# Patient Record
Sex: MALE | Race: WHITE | HISPANIC OR LATINO | URBAN - METROPOLITAN AREA
[De-identification: names, ages, dates, MRNs, and addresses within clinical notes are randomized per-mention and may not be internally consistent; named-entity substitution may affect disease eponyms.]

---

## 2017-01-06 ENCOUNTER — IMPORTED ENCOUNTER (OUTPATIENT)
Dept: URBAN - METROPOLITAN AREA CLINIC 9 | Facility: CLINIC | Age: 82
End: 2017-01-06

## 2017-01-23 NOTE — PATIENT DISCUSSION
Cataract Monitor Counseling:   I have discussed continuing with current spectacles vs updating. I have offered the patient a new spectacle prescription to fill if desires. Return to follow up as scheduled or sooner if symptoms arise.

## 2017-03-06 ENCOUNTER — IMPORTED ENCOUNTER (OUTPATIENT)
Dept: URBAN - METROPOLITAN AREA CLINIC 9 | Facility: CLINIC | Age: 82
End: 2017-03-06

## 2017-05-08 ENCOUNTER — IMPORTED ENCOUNTER (OUTPATIENT)
Dept: URBAN - METROPOLITAN AREA CLINIC 9 | Facility: CLINIC | Age: 82
End: 2017-05-08

## 2017-06-26 ENCOUNTER — IMPORTED ENCOUNTER (OUTPATIENT)
Dept: URBAN - METROPOLITAN AREA CLINIC 9 | Facility: CLINIC | Age: 82
End: 2017-06-26

## 2017-10-27 ENCOUNTER — IMPORTED ENCOUNTER (OUTPATIENT)
Dept: URBAN - METROPOLITAN AREA CLINIC 9 | Facility: CLINIC | Age: 82
End: 2017-10-27

## 2018-03-08 ENCOUNTER — IMPORTED ENCOUNTER (OUTPATIENT)
Dept: URBAN - METROPOLITAN AREA CLINIC 9 | Facility: CLINIC | Age: 83
End: 2018-03-08

## 2018-03-20 ENCOUNTER — IMPORTED ENCOUNTER (OUTPATIENT)
Dept: URBAN - METROPOLITAN AREA CLINIC 9 | Facility: CLINIC | Age: 83
End: 2018-03-20

## 2018-05-16 NOTE — PATIENT DISCUSSION
Disc needs prism and no signs of a palsy. 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 will call with any worsening to watch for myasthenia gravis or thryoid issue.  Per pt thyroid was normal.

## 2018-07-27 ENCOUNTER — IMPORTED ENCOUNTER (OUTPATIENT)
Dept: URBAN - METROPOLITAN AREA CLINIC 9 | Facility: CLINIC | Age: 83
End: 2018-07-27

## 2018-11-16 ENCOUNTER — IMPORTED ENCOUNTER (OUTPATIENT)
Dept: URBAN - METROPOLITAN AREA CLINIC 9 | Facility: CLINIC | Age: 83
End: 2018-11-16

## 2018-12-03 ENCOUNTER — IMPORTED ENCOUNTER (OUTPATIENT)
Dept: URBAN - METROPOLITAN AREA CLINIC 9 | Facility: CLINIC | Age: 83
End: 2018-12-03

## 2019-04-25 ENCOUNTER — IMPORTED ENCOUNTER (OUTPATIENT)
Dept: URBAN - METROPOLITAN AREA CLINIC 9 | Facility: CLINIC | Age: 84
End: 2019-04-25

## 2019-09-06 NOTE — PATIENT DISCUSSION
try toric lens and then if no improvement then give MF a try. long disc with pt that we cannot make cl as sharp as glasses.

## 2019-12-02 ENCOUNTER — IMPORTED ENCOUNTER (OUTPATIENT)
Dept: URBAN - METROPOLITAN AREA CLINIC 9 | Facility: CLINIC | Age: 84
End: 2019-12-02

## 2019-12-26 ENCOUNTER — IMPORTED ENCOUNTER (OUTPATIENT)
Dept: URBAN - METROPOLITAN AREA CLINIC 9 | Facility: CLINIC | Age: 84
End: 2019-12-26

## 2020-01-06 ENCOUNTER — IMPORTED ENCOUNTER (OUTPATIENT)
Dept: URBAN - METROPOLITAN AREA CLINIC 9 | Facility: CLINIC | Age: 85
End: 2020-01-06

## 2020-03-03 ENCOUNTER — IMPORTED ENCOUNTER (OUTPATIENT)
Dept: URBAN - METROPOLITAN AREA CLINIC 9 | Facility: CLINIC | Age: 85
End: 2020-03-03

## 2020-03-20 ENCOUNTER — IMPORTED ENCOUNTER (OUTPATIENT)
Dept: URBAN - METROPOLITAN AREA CLINIC 9 | Facility: CLINIC | Age: 85
End: 2020-03-20

## 2020-07-23 ENCOUNTER — IMPORTED ENCOUNTER (OUTPATIENT)
Dept: URBAN - METROPOLITAN AREA CLINIC 9 | Facility: CLINIC | Age: 85
End: 2020-07-23

## 2020-11-18 NOTE — PATIENT DISCUSSION
Long disc with pt that I cannot improve her vision greatly with rx alone. Mac OCT is normal OU---must be due to cataract and dry eye.

## 2020-12-07 ENCOUNTER — IMPORTED ENCOUNTER (OUTPATIENT)
Dept: URBAN - METROPOLITAN AREA CLINIC 9 | Facility: CLINIC | Age: 85
End: 2020-12-07

## 2020-12-14 ENCOUNTER — IMPORTED ENCOUNTER (OUTPATIENT)
Dept: URBAN - METROPOLITAN AREA CLINIC 9 | Facility: CLINIC | Age: 85
End: 2020-12-14

## 2021-04-08 ENCOUNTER — IMPORTED ENCOUNTER (OUTPATIENT)
Dept: URBAN - METROPOLITAN AREA CLINIC 9 | Facility: CLINIC | Age: 86
End: 2021-04-08

## 2021-04-09 ENCOUNTER — IMPORTED ENCOUNTER (OUTPATIENT)
Dept: URBAN - METROPOLITAN AREA CLINIC 9 | Facility: CLINIC | Age: 86
End: 2021-04-09

## 2021-04-13 ENCOUNTER — IMPORTED ENCOUNTER (OUTPATIENT)
Dept: URBAN - METROPOLITAN AREA CLINIC 9 | Facility: CLINIC | Age: 86
End: 2021-04-13

## 2021-05-07 ENCOUNTER — IMPORTED ENCOUNTER (OUTPATIENT)
Dept: URBAN - METROPOLITAN AREA CLINIC 9 | Facility: CLINIC | Age: 86
End: 2021-05-07

## 2021-09-13 ENCOUNTER — IMPORTED ENCOUNTER (OUTPATIENT)
Dept: URBAN - METROPOLITAN AREA CLINIC 9 | Facility: CLINIC | Age: 86
End: 2021-09-13

## 2021-09-15 ENCOUNTER — IMPORTED ENCOUNTER (OUTPATIENT)
Dept: URBAN - METROPOLITAN AREA CLINIC 9 | Facility: CLINIC | Age: 86
End: 2021-09-15

## 2021-10-01 ENCOUNTER — IMPORTED ENCOUNTER (OUTPATIENT)
Dept: URBAN - METROPOLITAN AREA CLINIC 9 | Facility: CLINIC | Age: 86
End: 2021-10-01

## 2021-10-01 PROBLEM — H40.42X1: Noted: 2021-10-01

## 2021-10-01 PROBLEM — H04.123: Noted: 2021-10-01

## 2021-10-02 ASSESSMENT — VISUAL ACUITY
OD_SC: 20/100 SN
OS_SC: 20/70 SN
OS_CC: 20/25 - SN
OD_CC: 20/25 SN

## 2021-10-02 ASSESSMENT — TONOMETRY
OD_IOP_MMHG: 16
OS_IOP_MMHG: 20

## 2021-10-18 ASSESSMENT — VISUAL ACUITY
OS_PH: 20/50 + SN
OD_SC: 20/100 - SN
OS_CC: 20/40 SN
OD_CC: 20/25 -2 SN
OD_CC: 20/20 -2 SN
OS_CC: 20/40 SN
OS_CC: 20/40 SN
OS_CC: 20/25 -2 SN
OS_CC: 20/30 + SN
OD_CC: 20/30 - SN
OD_CC: 20/25 -2 SN
OD_CC: 20/40 + SN
OS_CC: 20/80 SN
OS_CC: 20/30 SN
OS_CC: 20/30 -2 SN
OD_CC: 20/25 -2 SN
OS_CC: 20/30 SN
OS_CC: 20/25 -2 SN
OS_CC: 20/40 -2 SN
OD_CC: 20/40 -2 SN
OD_CC: 20/25 SN
OS_CC: 20/40 + SN
OS_CC: 20/25 SN
OD_CC: 20/30 - SN
OS_CC: 20/50 - SN
OD_CC: 20/30 -2 SN
OD_CC: 20/40 + SN
OD_PH: 20/50 + SN
OD_CC: 20/30 - SN
OD_SC: 20/60 SN
OD_CC: 20/25 -2 SN
OS_SC: 20/70 - SN
OD_CC: 20/30 SN
OS_CC: 20/30 - SN
OD_CC: 20/40 + SN
OD_CC: 20/30 + SN
OD_CC: 20/30 + SN
OS_CC: 20/40 -2 SN
OS_CC: 20/25 - SN
OS_SC: 20/200 + SN
OS_CC: 20/30 SN
OS_CC: 20/40 + SN
OD_CC: 20/30 - SN
OD_CC: 20/25 -2 SN
OD_CC: 20/30 - SN
OS_CC: 20/30 SN
OS_CC: 20/30 + SN
OD_CC: 20/30 + SN

## 2021-10-18 ASSESSMENT — TONOMETRY
OS_IOP_MMHG: 19
OD_IOP_MMHG: 19
OD_IOP_MMHG: 21
OS_IOP_MMHG: 15
OS_IOP_MMHG: 18
OD_IOP_MMHG: 19
OD_IOP_MMHG: 16
OD_IOP_MMHG: 16
OD_IOP_MMHG: 20
OS_IOP_MMHG: 17
OD_IOP_MMHG: 21
OD_IOP_MMHG: 18
OD_IOP_MMHG: 22
OS_IOP_MMHG: 14
OD_IOP_MMHG: 16
OD_IOP_MMHG: 23
OS_IOP_MMHG: 14
OS_IOP_MMHG: 10
OS_IOP_MMHG: 11
OS_IOP_MMHG: 14
OS_IOP_MMHG: 18
OD_IOP_MMHG: 19
OD_IOP_MMHG: 13
OD_IOP_MMHG: 16
OS_IOP_MMHG: 15
OS_IOP_MMHG: 12
OD_IOP_MMHG: 13
OD_IOP_MMHG: 21
OS_IOP_MMHG: 25
OD_IOP_MMHG: 15
OS_IOP_MMHG: 12
OS_IOP_MMHG: 18
OS_IOP_MMHG: 14
OD_IOP_MMHG: 20
OD_IOP_MMHG: 21
OS_IOP_MMHG: 18
OS_IOP_MMHG: 18
OD_IOP_MMHG: 19
OS_IOP_MMHG: 24

## 2021-10-18 ASSESSMENT — PACHYMETRY
OS_CT_UM: 574.0
OD_CT_UM: 575.0

## 2022-01-18 ENCOUNTER — ESTABLISHED PATIENT (OUTPATIENT)
Dept: URBAN - METROPOLITAN AREA CLINIC 9 | Facility: CLINIC | Age: 87
End: 2022-01-18

## 2022-01-18 DIAGNOSIS — H35.3131: ICD-10-CM

## 2022-01-18 DIAGNOSIS — H04.123: ICD-10-CM

## 2022-01-18 DIAGNOSIS — H40.42X1: ICD-10-CM

## 2022-01-18 PROCEDURE — 92015 DETERMINE REFRACTIVE STATE: CPT

## 2022-01-18 PROCEDURE — 99214 OFFICE O/P EST MOD 30 MIN: CPT

## 2022-01-18 PROCEDURE — 92134 CPTRZ OPH DX IMG PST SGM RTA: CPT

## 2022-01-18 ASSESSMENT — TONOMETRY
OD_IOP_MMHG: 17
OS_IOP_MMHG: 15

## 2022-01-18 ASSESSMENT — VISUAL ACUITY
OD_CC: 20/30+2
OS_CC: 20/25+1

## 2022-01-28 ENCOUNTER — ESTABLISHED PATIENT (OUTPATIENT)
Dept: URBAN - METROPOLITAN AREA CLINIC 9 | Facility: CLINIC | Age: 87
End: 2022-01-28

## 2022-01-28 DIAGNOSIS — H02.884: ICD-10-CM

## 2022-01-28 DIAGNOSIS — H02.881: ICD-10-CM

## 2022-01-28 DIAGNOSIS — H04.123: ICD-10-CM

## 2022-01-28 DIAGNOSIS — H00.14: ICD-10-CM

## 2022-01-28 PROCEDURE — 99213 OFFICE O/P EST LOW 20 MIN: CPT

## 2022-01-28 ASSESSMENT — VISUAL ACUITY
OD_CC: 20/30-2
OS_CC: 20/40-2
OU_CC: 20/40

## 2022-01-28 ASSESSMENT — TONOMETRY
OD_IOP_MMHG: 18
OS_IOP_MMHG: 14

## 2022-06-23 RX ORDER — TRAMADOL HYDROCHLORIDE 50 MG/1
TABLET ORAL
COMMUNITY
Start: 2022-04-22 | End: 2022-08-09 | Stop reason: SDUPTHER

## 2022-06-23 RX ORDER — TADALAFIL 5 MG/1
TABLET ORAL
COMMUNITY
End: 2022-08-09 | Stop reason: SDUPTHER

## 2022-06-23 RX ORDER — CELECOXIB 200 MG/1
200 CAPSULE ORAL DAILY
COMMUNITY

## 2022-06-23 RX ORDER — DIAZEPAM 5 MG/1
TABLET ORAL
COMMUNITY
End: 2022-08-09 | Stop reason: SDUPTHER

## 2022-06-23 RX ORDER — FUROSEMIDE 40 MG/1
40 TABLET ORAL DAILY
COMMUNITY
Start: 2022-04-22

## 2022-06-23 RX ORDER — IRBESARTAN 300 MG/1
TABLET ORAL
COMMUNITY
End: 2022-09-09 | Stop reason: SDUPTHER

## 2022-06-23 RX ORDER — PYRIDOXINE HCL (VITAMIN B6) 50 MG
TABLET ORAL
COMMUNITY

## 2022-06-23 RX ORDER — COLCHICINE 0.6 MG/1
TABLET ORAL
COMMUNITY

## 2022-06-23 RX ORDER — NEBIVOLOL 5 MG/1
TABLET ORAL
COMMUNITY
Start: 2022-05-06 | End: 2022-09-06

## 2022-06-23 RX ORDER — ALLOPURINOL 300 MG/1
TABLET ORAL
COMMUNITY
Start: 2014-07-21

## 2022-06-23 RX ORDER — POTASSIUM CHLORIDE 20 MEQ/1
TABLET, EXTENDED RELEASE ORAL
COMMUNITY
Start: 2022-04-22

## 2022-06-23 RX ORDER — CLOPIDOGREL BISULFATE 75 MG/1
TABLET ORAL
COMMUNITY
End: 2022-08-31 | Stop reason: SDUPTHER

## 2022-07-28 ENCOUNTER — TECH ONLY (OUTPATIENT)
Dept: URBAN - METROPOLITAN AREA CLINIC 9 | Facility: CLINIC | Age: 87
End: 2022-07-28

## 2022-07-28 DIAGNOSIS — H40.42X1: ICD-10-CM

## 2022-07-28 PROCEDURE — 92083 EXTENDED VISUAL FIELD XM: CPT

## 2022-08-02 ENCOUNTER — ESTABLISHED PATIENT (OUTPATIENT)
Dept: URBAN - METROPOLITAN AREA CLINIC 9 | Facility: CLINIC | Age: 87
End: 2022-08-02

## 2022-08-02 DIAGNOSIS — H02.884: ICD-10-CM

## 2022-08-02 DIAGNOSIS — H40.42X1: ICD-10-CM

## 2022-08-02 DIAGNOSIS — H02.881: ICD-10-CM

## 2022-08-02 PROCEDURE — 92133 CPTRZD OPH DX IMG PST SGM ON: CPT

## 2022-08-02 PROCEDURE — 99213 OFFICE O/P EST LOW 20 MIN: CPT

## 2022-08-02 ASSESSMENT — TONOMETRY
OD_IOP_MMHG: 20
OS_IOP_MMHG: 17

## 2022-08-02 ASSESSMENT — VISUAL ACUITY
OS_CC: 20/40+1
OD_CC: 20/25-1

## 2022-08-08 PROBLEM — M17.11 PRIMARY OSTEOARTHRITIS OF RIGHT KNEE: Status: ACTIVE | Noted: 2022-08-08

## 2022-08-08 PROBLEM — R10.31 RLQ ABDOMINAL PAIN: Status: ACTIVE | Noted: 2022-08-08

## 2022-08-08 PROBLEM — G89.29 OTHER CHRONIC PAIN: Status: ACTIVE | Noted: 2022-08-08

## 2022-08-08 PROBLEM — I87.2 VENOUS INSUFFICIENCY OF BOTH LOWER EXTREMITIES: Status: ACTIVE | Noted: 2022-08-08

## 2022-08-08 PROBLEM — R22.43 LOCALIZED SWELLING OF BOTH LOWER LEGS: Status: ACTIVE | Noted: 2022-08-08

## 2022-08-08 PROBLEM — M54.40 BACK PAIN OF LUMBOSACRAL REGION WITH SCIATICA: Status: ACTIVE | Noted: 2022-08-08

## 2022-08-08 PROBLEM — M17.12 PRIMARY OSTEOARTHRITIS OF LEFT KNEE: Status: ACTIVE | Noted: 2022-08-08

## 2022-08-08 PROBLEM — I10 ESSENTIAL HYPERTENSION: Status: ACTIVE | Noted: 2022-08-08

## 2022-08-08 PROBLEM — M76.62 ACHILLES TENDINITIS OF LEFT LOWER EXTREMITY: Status: ACTIVE | Noted: 2022-08-08

## 2022-08-08 PROBLEM — M19.91 PRIMARY OSTEOARTHRITIS: Status: ACTIVE | Noted: 2022-08-08

## 2022-08-08 PROBLEM — Z96.659 ARTIFICIAL KNEE JOINT PRESENT: Status: ACTIVE | Noted: 2022-08-08

## 2022-08-08 PROBLEM — G56.02 LEFT CARPAL TUNNEL SYNDROME: Status: ACTIVE | Noted: 2022-08-08

## 2022-08-08 PROBLEM — S39.012A LOW BACK STRAIN: Status: ACTIVE | Noted: 2022-08-08

## 2022-08-08 PROBLEM — M54.17 THORACIC AND LUMBOSACRAL NEURITIS: Status: ACTIVE | Noted: 2022-08-08

## 2022-08-08 PROBLEM — R01.1 CARDIAC MURMUR: Status: ACTIVE | Noted: 2022-08-08

## 2022-08-08 PROBLEM — L03.115 CELLULITIS OF RIGHT LEG: Status: ACTIVE | Noted: 2022-08-08

## 2022-08-08 PROBLEM — M1A.0790 IDIOPATHIC CHRONIC GOUT OF FOOT WITHOUT TOPHUS: Status: ACTIVE | Noted: 2022-08-08

## 2022-08-08 PROBLEM — G89.29 CHRONIC LOW BACK PAIN: Status: ACTIVE | Noted: 2022-08-08

## 2022-08-08 PROBLEM — M17.0 PRIMARY OSTEOARTHRITIS OF BOTH KNEES: Status: ACTIVE | Noted: 2022-08-08

## 2022-08-08 PROBLEM — M54.14 THORACIC AND LUMBOSACRAL NEURITIS: Status: ACTIVE | Noted: 2022-08-08

## 2022-08-08 PROBLEM — M54.50 CHRONIC LOW BACK PAIN: Status: ACTIVE | Noted: 2022-08-08

## 2022-08-18 PROBLEM — I45.10 RIGHT BUNDLE BRANCH BLOCK: Status: ACTIVE | Noted: 2022-08-18

## 2022-08-18 PROBLEM — H40.9 GLAUCOMA: Status: ACTIVE | Noted: 2022-08-18

## 2023-04-11 ENCOUNTER — ESTABLISHED PATIENT (OUTPATIENT)
Dept: URBAN - METROPOLITAN AREA CLINIC 9 | Facility: CLINIC | Age: 88
End: 2023-04-11

## 2023-04-11 DIAGNOSIS — H40.42X1: ICD-10-CM

## 2023-04-11 PROCEDURE — 99213 OFFICE O/P EST LOW 20 MIN: CPT

## 2023-04-11 ASSESSMENT — VISUAL ACUITY
OD_CC: 20/25
OS_CC: 20/30-1

## 2023-04-11 ASSESSMENT — TONOMETRY
OS_IOP_MMHG: 50
OD_IOP_MMHG: 25
OS_IOP_MMHG: 55
OD_IOP_MMHG: 21
OS_IOP_MMHG: 35
OD_IOP_MMHG: 30

## 2023-05-09 ENCOUNTER — ESTABLISHED PATIENT (OUTPATIENT)
Dept: URBAN - METROPOLITAN AREA CLINIC 9 | Facility: CLINIC | Age: 88
End: 2023-05-09

## 2023-05-09 DIAGNOSIS — H26.492: ICD-10-CM

## 2023-05-09 DIAGNOSIS — Z96.1: ICD-10-CM

## 2023-05-09 DIAGNOSIS — H35.3131: ICD-10-CM

## 2023-05-09 DIAGNOSIS — H40.42X1: ICD-10-CM

## 2023-05-09 DIAGNOSIS — H02.881: ICD-10-CM

## 2023-05-09 DIAGNOSIS — H02.423: ICD-10-CM

## 2023-05-09 PROCEDURE — 92134 CPTRZ OPH DX IMG PST SGM RTA: CPT

## 2023-05-09 PROCEDURE — 99214 OFFICE O/P EST MOD 30 MIN: CPT

## 2023-05-09 PROCEDURE — 92015 DETERMINE REFRACTIVE STATE: CPT

## 2023-05-09 ASSESSMENT — VISUAL ACUITY
OS_SC: 20/60-1
OD_CC: 20/25-2
OD_SC: 20/70
OS_CC: 20/40-2

## 2023-05-09 ASSESSMENT — TONOMETRY
OS_IOP_MMHG: 10
OD_IOP_MMHG: 13

## 2023-07-07 ENCOUNTER — POST-OP (OUTPATIENT)
Dept: URBAN - METROPOLITAN AREA CLINIC 9 | Facility: CLINIC | Age: 88
End: 2023-07-07

## 2023-07-07 DIAGNOSIS — Z98.890: ICD-10-CM

## 2023-07-07 DIAGNOSIS — H40.42X1: ICD-10-CM

## 2023-07-07 PROCEDURE — 99024 POSTOP FOLLOW-UP VISIT: CPT

## 2023-07-07 ASSESSMENT — KERATOMETRY
OD_AXISANGLE2_DEGREES: 16
OS_AXISANGLE_DEGREES: 99
OS_K2POWER_DIOPTERS: 43.75
OD_K1POWER_DIOPTERS: 40.75
OS_K1POWER_DIOPTERS: 41.50
OD_K2POWER_DIOPTERS: 44.75
OS_AXISANGLE2_DEGREES: 9
OD_AXISANGLE_DEGREES: 106

## 2023-07-07 ASSESSMENT — VISUAL ACUITY
OD_CC: 20/40+2
OS_SC: 20/60-1
OU_CC: 20/40+1
OS_CC: 20/50+1
OD_SC: 20/60-1
OU_SC: 20/60

## 2023-07-07 ASSESSMENT — TONOMETRY
OD_IOP_MMHG: 14
OS_IOP_MMHG: 13

## 2024-11-15 ENCOUNTER — EMERGENCY VISIT (OUTPATIENT)
Facility: LOCATION | Age: 89
End: 2024-11-15

## 2024-11-15 DIAGNOSIS — H02.413: ICD-10-CM

## 2024-11-15 DIAGNOSIS — H43.812: ICD-10-CM

## 2024-11-15 DIAGNOSIS — H02.884: ICD-10-CM

## 2024-11-15 DIAGNOSIS — H02.881: ICD-10-CM

## 2024-11-15 DIAGNOSIS — H35.3131: ICD-10-CM

## 2024-11-15 DIAGNOSIS — H40.42X1: ICD-10-CM

## 2024-11-15 PROCEDURE — 92134 CPTRZ OPH DX IMG PST SGM RTA: CPT

## 2024-11-15 PROCEDURE — 92014 COMPRE OPH EXAM EST PT 1/>: CPT
